# Patient Record
Sex: MALE | Race: WHITE | NOT HISPANIC OR LATINO
[De-identification: names, ages, dates, MRNs, and addresses within clinical notes are randomized per-mention and may not be internally consistent; named-entity substitution may affect disease eponyms.]

---

## 2020-07-13 ENCOUNTER — DASHBOARD ENCOUNTERS (OUTPATIENT)
Age: 25
End: 2020-07-13

## 2020-07-13 ENCOUNTER — OFFICE VISIT (OUTPATIENT)
Dept: URBAN - METROPOLITAN AREA CLINIC 23 | Facility: CLINIC | Age: 25
End: 2020-07-13
Payer: COMMERCIAL

## 2020-07-13 DIAGNOSIS — K21.9 ACID REFLUX DISEASE: ICD-10-CM

## 2020-07-13 DIAGNOSIS — R11.0 NAUSEA: ICD-10-CM

## 2020-07-13 DIAGNOSIS — R63.4 WEIGHT LOSS: ICD-10-CM

## 2020-07-13 DIAGNOSIS — K59.01 CONSTIPATION: ICD-10-CM

## 2020-07-13 PROCEDURE — 99213 OFFICE O/P EST LOW 20 MIN: CPT | Performed by: INTERNAL MEDICINE

## 2020-07-13 PROCEDURE — G8430 EC AT DOC MEDREC PT NOT ELIG: HCPCS | Performed by: INTERNAL MEDICINE

## 2020-07-13 PROCEDURE — G9903 PT SCRN TBCO ID AS NON USER: HCPCS | Performed by: INTERNAL MEDICINE

## 2020-07-13 PROCEDURE — G8420 CALC BMI NORM PARAMETERS: HCPCS | Performed by: INTERNAL MEDICINE

## 2020-07-13 NOTE — HPI-TODAY'S VISIT:
on today's visit reflux- he states that it is completely gone, no nausea, vomiting. no overt symptoms.  not taking any meds at all.  constipation-  had an episode about a week ago, states that it cleared up on it's own. he states that he is doing no specific regimen at this time to help.  besides that one episode , nothing noticable.  no issues with emptying. no diarrhea. feels that his weight has been stable, feels that his appetite has gone back to his baseline.   he has been doing more vegetables- he is now staying with his parents and feels that eating cooking from home has helped a lot the throat discomfort is intermittent but has mostly gone away.

## 2020-07-13 NOTE — HPI-OTHER HISTORIES
Patient is a 25-year-old male who has been sent to us last year for a multitude of GI symptoms including severe constipation, acid reflux, and weight loss. He was last seen in January 2020 and had been off of all medications at that time